# Patient Record
(demographics unavailable — no encounter records)

---

## 2024-10-19 NOTE — DISCUSSION/SUMMARY
[FreeTextEntry1] :  D/W caregiver viral URI- recommend supportive care including antipyretics, fluids, and nasal saline followed by nasal suction. Return if symptoms worsen or persist. No ankyloglossia on exam today- reviewed nipple size, feeding techniques, call for f/u if continued concerns. time spent : 20min

## 2024-10-19 NOTE — REVIEW OF SYSTEMS
[Nasal Congestion] : nasal congestion [Fever] : no fever [Cough] : no cough [Vomiting] : no vomiting [Diarrhea] : no diarrhea

## 2024-10-19 NOTE — HISTORY OF PRESENT ILLNESS
[de-identified] : per mom pt has congestion, afebrile  [FreeTextEntry6] :  + congestion X 2-3days no cough, no ST, no ear pain, no n/v/c; loose stool yesterday- no blood or mucus in stool, eating and drinking well, normal voiding. afebrile. no COVID or flu exposure mom also c/o on tongue tie, eats slowly- just increased nipple size, per mom pt able to protrude tongue beyond lower lip

## 2024-11-14 NOTE — PLAN
[TextEntry] :  Symptomatic treatment Instructed to return to office if condition worsens or new symptoms arise         Discussed with family eczema possible etiologies, natural course and possible complications.  Discussed preference of scent free and dye free skin contact products.  Discussed appropriate use of emollients and preferred brands.   increase dose famotidine as ordered

## 2024-11-14 NOTE — PHYSICAL EXAM
[TextEntry] : General: awake, alert, cooperative, appropriate, no acute distress Head: no signs injury Eyes: EOMI, PERRL, no discharge, no conjunctival or scleral erythema  Nose: no rhinnorhea Mouth: mucosa moist and pink Neck: supple, good range of motion Lungs: clear to auscultation bilaterally  Cardiac: normal S1 S2, regular rate and rhythm Abdomen: soft, non tender, non distended Lymphatics: no cervical lymphadenopathy, no pre or post auricular lymphadenopathy, no occipital lymphadenopathy Skin: erythematous mild  papular rash abdomen, cradle cap

## 2024-11-14 NOTE — HISTORY OF PRESENT ILLNESS
[de-identified] : Rash to stomach, no fevers [FreeTextEntry6] : rash on belly red and bumpy started using new sleep sack but otherwise no new contacts no sck symptoms feelingwell otherwise eating/drinking well some reflux worsening again mom wants to know if shes outgrowing dose

## 2024-12-03 NOTE — HISTORY OF PRESENT ILLNESS
[de-identified] : glassy eyes, exposed to covid  [FreeTextEntry6] : Exposed to covid 5 days ago Eyes looked glassy yesterday and today, no discharge or redness Slight cough today No fever or temp > 100 No ear pulling No wheezing or dyspnea No nasal congestion Normal appetite, No vomiting, No diarrhea No recent travel or contact with travelers

## 2024-12-03 NOTE — PHYSICAL EXAM
[Playful] : playful [Clear Rhinorrhea] : clear rhinorrhea [Soft] : soft [Warm, Well Perfused x4] : warm, well perfused x4 [NL] : warm, clear [Lethargic] : not lethargic [Toxic] : not toxic [Stridor] : no stridor [Increased Tearing] : no increased tearing [Discharge] : no discharge [Eyelid Swelling] : no eyelid swelling [Conjuctival Injection] : no conjunctival injection [Erythema] : no erythema [Bulging] : not bulging [Vesicles] : no vesicles [Exudate] : no exudate [Ulcerative Lesions] : no ulcerative lesions [Wheezing] : no wheezing [Rales] : no rales [Crackles] : no crackles [Transmitted Upper Airway Sounds] : no transmitted upper airway sounds [Tachypnea] : no tachypnea [Rhonchi] : no rhonchi [Belly Breathing] : no belly breathing [Subcostal Retractions] : no subcostal retractions [Suprasternal Retractions] : no suprasternal retractions [Distended] : nondistended [FreeTextEntry2] : AFOF

## 2024-12-03 NOTE — DISCUSSION/SUMMARY
[FreeTextEntry1] : Symptomatic treatment advised Covid test Maintain adequate hydration Cool mist humidifier Saline nose drops, bulb suctioning as needed  Stressed handwashing and infection control Pay close observation for new or worsening symptoms Instructed to return to office if condition worsens or new symptoms arise Go to ER or UC if condition worsens or unable to to get to the office or after office hours Recheck prn, has WCC tomorrow, advised dad to keep appointment so baby can get rechecked

## 2024-12-03 NOTE — REVIEW OF SYSTEMS
"Pre-Visit Planning   Next 5 appointments (look out 90 days)    Apr 28, 2021  Arrive by 8:50 AM  Office Visit with Shiraz Gil MD  [facility] ([facility] ) [address] [facility] [id number] [phone]        Appointment Notes for this encounter:   reviewed JQ // med check    Questionnaires Reviewed/Assigned  No additional questionnaires are needed        Patient preferred phone number: [phone]      Spoke to patient via phone. Patient does not have additional questions or concerns.        Visit is not preventive.    Patient is established.    Patient reminded of date and time.   Chief complaint confirmed.    Health Maintenance Due   Topic Date Due     ANNUAL REVIEW OF HM ORDERS  Never done     ADVANCE CARE PLANNING  Never done     HIV SCREENING  Never done     HEPATITIS C SCREENING  Never done     INFLUENZA VACCINE (1) 09/01/2020     DTAP/TDAP/TD IMMUNIZATION (2 - Td) 09/01/2020     PREVENTIVE CARE VISIT  12/20/2020     PHQ-2  01/01/2021     [employer]  Patient is active on [employer].    Questionnaire Review   Offered information on completing questionnaires via [employer].    Call Summary  \"Thank you for your time today.  If anything comes up before your appointment, please feel free to contact us at [phone].\"    " [Cough] : cough [Negative] : Genitourinary [Fever] : no fever [Eye Discharge] : no eye discharge [Ear Tugging] : no ear tugging [Nasal Discharge] : no nasal discharge [Nasal Congestion] : no nasal congestion [Cyanosis] : no cyanosis [Tachypnea] : not tachypneic [Wheezing] : no wheezing

## 2024-12-04 NOTE — HISTORY OF PRESENT ILLNESS
[Parents] : parents [Well-balanced] : well-balanced [Normal] : Normal [___ voids per day] : [unfilled] voids per day [Frequency of stools: ___] : Frequency of stools: [unfilled]  stools [In Bassinet/Crib] : sleeps in bassinet/crib [On back] : sleeps on back [Sleeps 12-16 hours per 24 hours (including naps)] : sleeps 12-16 hours per 24 hours (including naps) [Tummy time] : tummy time [No] : No cigarette smoke exposure [Water heater temperature set at <120 degrees F] : Water heater temperature set at <120 degrees F [Rear facing car seat in back seat] : Rear facing car seat in back seat [Carbon Monoxide Detectors] : Carbon monoxide detectors at home [Smoke Detectors] : Smoke detectors at home. [Co-sleeping] : no co-sleeping [Loose bedding, pillow, toys, and/or bumpers in crib] : no loose bedding, pillow, toys, and/or bumpers in crib [Exposure to electronic nicotine delivery system] : No exposure to electronic nicotine delivery system [FreeTextEntry7] : 4 month well [de-identified] : Enfamil Gentlease 5oz every 2-3 hours, sleeps overnight (25-30oz/day) [FreeTextEntry1] : No specialists  Pending dermatology for dry skin/eczema

## 2024-12-04 NOTE — DEVELOPMENTAL MILESTONES
[Normal Development] : Normal Development [None] : none [Laughs aloud] : laughs aloud [Turns to voice] : turns to voice [Vocalizes with extending cooing] : vocalizes with extending cooing [FreeTextEntry1] : GM -4-1 FM- 4-2 PS -4 L -4-1

## 2024-12-04 NOTE — PLAN
[TextEntry] : Recommend daily moisturizer for dry skin. Avoid synthetic clothing. Bathe every 2-3 days, avoiding hot water.  Sleep with cool mist humidifier. See dermatology   Recommend exclusive breastfeeding, 8-12 feedings per day. Mother should continue prenatal vitamins and avoid alcohol. If formula is needed, recommend iron-fortified formulations 6-8 feeds per day. When in car, patient should be in rear-facing car seat in back seat, do not leave baby in car for any reason or any time. Put baby to sleep on back, in own crib with no loose or soft bedding, no co-sleeping. Do not overheat baby. Protect baby by avoiding large crowds, extreme hot or cold environments, do not put baby in direct sun. Help baby to develop sleep and feeding routines. May offer pacifier if needed. Continue tummy time when awake and observed, to encourage play and muscle strength. Avoid screen/tv time at this age. Limit baby's exposure to others, especially those with fever or unknown vaccine status. Wash hands often, especially after changing diaper. Keep tobacco/vape free household and environment. Encourage family/caregivers to get protective vaccinations (flu, tdap). Parents counseled to call if rectal temperature >100.4 degrees F. Encourage parents to take CPR program/first aid program for infant. Start creating a daily routine for feeds and naps. Encourage both quiet and active playtime with baby for emerging social play development.   Iron fortified cereals mixed with formula or breast milk may be introduced using a spoon and bowl. Reinforced to only start solids/cereals if baby is able to hold head upright without support.  Monitor for tooth eruption, no bottles in bed, can start wiping erupting teeth with wash cloth or a soft toothbrush.   [ Vaccinations given at this visit can cause low grade temperature, irritability, and site reaction -- can use warm compress and Tylenol as needed ]   Return for 6 month well visit or sooner if needed or if concerns arise.

## 2024-12-04 NOTE — PHYSICAL EXAM
[Alert] : alert [Acute Distress] : no acute distress [Normocephalic] : normocephalic [Flat Open Anterior [place]] : flat open anterior fontanelle [Red Reflex] : red reflex bilateral [PERRL] : PERRL [Normally Placed Ears] : normally placed ears [Auricles Well Formed] : auricles well formed [Clear Tympanic membranes] : clear tympanic membranes [Light reflex present] : light reflex present [Bony landmarks visible] : bony landmarks visible [Discharge] : no discharge [Nares Patent] : nares patent [Palate Intact] : palate intact [Uvula Midline] : uvula midline [Palpable Masses] : no palpable masses [Symmetric Chest Rise] : symmetric chest rise [Clear to Auscultation Bilaterally] : clear to auscultation bilaterally [Regular Rate and Rhythm] : regular rate and rhythm [S1, S2 present] : S1, S2 present [Murmurs] : no murmurs [+2 Femoral Pulses] : (+) 2 femoral pulses [Soft] : soft [Tender] : nontender [Distended] : nondistended [Bowel Sounds] : bowel sounds present [Hepatomegaly] : no hepatomegaly [Splenomegaly] : no splenomegaly [Normal External Genitalia] : normal external genitalia [Clitoromegaly] : no clitoromegaly [Normal Vaginal Introitus] : normal vaginal introitus [Patent] : patent [Normally Placed] : normally placed [No Abnormal Lymph Nodes Palpated] : no abnormal lymph nodes palpated [Sheriff-Ortolani] : negative Sheriff-Ortolani [Allis Sign] : negative Allis sign [Spinal Dimple] : no spinal dimple [Tuft of Hair] : no tuft of hair [Startle Reflex] : startle reflex present [Plantar Grasp] : plantar grasp reflex present [Symmetric Soto] : symmetric soto [Rash or Lesions] : no rash/lesions

## 2025-01-31 NOTE — PHYSICAL EXAM
[Alert] : alert [Normocephalic] : normocephalic [Flat Open Anterior [place]] : flat open anterior fontanelle [Red Reflex] : red reflex bilateral [PERRL] : PERRL [Normally Placed Ears] : normally placed ears [Auricles Well Formed] : auricles well formed [Clear Tympanic membranes] : clear tympanic membranes [Light reflex present] : light reflex present [Bony landmarks visible] : bony landmarks visible [Nares Patent] : nares patent [Palate Intact] : palate intact [Uvula Midline] : uvula midline [Supple, full passive range of motion] : supple, full passive range of motion [Symmetric Chest Rise] : symmetric chest rise [Clear to Auscultation Bilaterally] : clear to auscultation bilaterally [Regular Rate and Rhythm] : regular rate and rhythm [S1, S2 present] : S1, S2 present [+2 Femoral Pulses] : (+) 2 femoral pulses [Soft] : soft [Bowel Sounds] : bowel sounds present [Normal External Genitalia] : normal external genitalia [Normal Vaginal Introitus] : normal vaginal introitus [Patent] : patent [Normally Placed] : normally placed [No Abnormal Lymph Nodes Palpated] : no abnormal lymph nodes palpated [Symmetric Buttocks Creases] : symmetric buttocks creases [Plantar Grasp] : plantar grasp reflex present [Cranial Nerves Grossly Intact] : cranial nerves grossly intact [Acute Distress] : no acute distress [Discharge] : no discharge [Tooth Eruption] : no tooth eruption [Palpable Masses] : no palpable masses [Murmurs] : no murmurs [Tender] : nontender [Distended] : nondistended [Hepatomegaly] : no hepatomegaly [Splenomegaly] : no splenomegaly [Clitoromegaly] : no clitoromegaly [Sheriff-Ortolani] : negative Sheriff-Ortolani [Allis Sign] : negative Allis sign [Spinal Dimple] : no spinal dimple [Tuft of Hair] : no tuft of hair [Rash or Lesions] : no rash/lesions [de-identified] : unequal duplicated gluteal cleft

## 2025-01-31 NOTE — PLAN
[TextEntry] : Continue recommend iron-fortified formulations as discussed. When in car, patient should be in rear-facing car seat in back seat, do not leave baby in car for any reason or any time. Put baby to sleep on back, in own crib with no loose or soft bedding, no co-sleeping. Can use sunscreen if outside, do not put baby in direct sunlight. Help baby to develop consistent sleep and feeding routines. May offer pacifier if needed. Continue tummy time when awake and observed, to encourage play and muscle strength. Encourage sitting up. Avoid screen/tv time at this age. Wash hands often, especially after changing diaper. Keep tobacco/vape free household and environment. Encourage family/caregivers to get protective vaccinations. Talk with your baby, play music and sing, can play peekaboo with baby. Encourage face to face interactions and communication. Encourage self-calming behaviors. Maintain safe home due to increasingly mobile baby. Do not leave baby alone.   Monitor for tooth eruption, no propping bottles, and no bottles in bed, can start wiping erupting teeth with wash cloth or a soft toothbrush. Introduce single-ingredient foods rich in iron varying in texture, one at a time, several apart to watch for any allergic reaction. Do not give baby honey. Avoid sweetened drinks/juices. Avoid choking by limiting finger foods to smaller than a cheerio. No hard/compressible foods. Start daily multivitamin with fluoride.   [ Vaccinations given at this visit can cause low grade temperature, irritability, and site reaction -- can use warm compress and Tylenol / Ibuprofen as needed ]   See neuro for gluteal cleft concerns, and see allergist for oat allergy, avoid oats  PT referral placed for gross motor delay concerns   Return for 9 month well visit or sooner if needed or if concerns arise.

## 2025-01-31 NOTE — DEVELOPMENTAL MILESTONES
[Normal Development] : Normal Development [None] : none [Pats or smiles at reflection] : pats or smiles at reflection [Begins to turn when name called] : begins to turn when name called [Babbles] : babbles [Reaches for object and transfers] : reaches for object and transfers [Rakes small object with 4 fingers] : rakes small object with 4 fingers [[place] small object on surface] : bangs small object on surface [Rolls over prone to supine] : does not roll over prone to supine [Sits briefly without support] : does not sit briefly without support [FreeTextEntry1] : denver reviewed  gross motor concerns

## 2025-01-31 NOTE — DEVELOPMENTAL MILESTONES
[Normal Development] : Normal Development [None] : none [Pats or smiles at reflection] : pats or smiles at reflection [Begins to turn when name called] : begins to turn when name called [Babbles] : babbles [Reaches for object and transfers] : reaches for object and transfers [Rakes small object with 4 fingers] : rakes small object with 4 fingers [Thayer small object on surface] : bangs small object on surface [Rolls over prone to supine] : does not roll over prone to supine [Sits briefly without support] : does not sit briefly without support [FreeTextEntry1] : denver reviewed  gross motor concerns

## 2025-01-31 NOTE — DISCUSSION/SUMMARY
[Normal Growth] : growth [Normal Development] : development [None] : No medical problems [No Elimination Concerns] : elimination [No Feeding Concerns] : feeding [No Skin Concerns] : skin [Normal Sleep Pattern] : sleep [Family Functioning] : family functioning [Nutrition and Feeding] : nutrition and feeding [Infant Development] : infant development [Oral Health] : oral health [Safety] : safety [No Medications] : ~He/She~ is not on any medications [Parent/Guardian] : parent/guardian [] : The components of the vaccine(s) to be administered today are listed in the plan of care. The disease(s) for which the vaccine(s) are intended to prevent and the risks have been discussed with the caretaker.  The risks are also included in the appropriate vaccination information statements which have been provided to the patient's caregiver.  The caregiver has given consent to vaccinate. [FreeTextEntry1] : deferred flu/covid

## 2025-01-31 NOTE — PHYSICAL EXAM
[Alert] : alert [Normocephalic] : normocephalic [Flat Open Anterior [place]] : flat open anterior fontanelle [Red Reflex] : red reflex bilateral [PERRL] : PERRL [Normally Placed Ears] : normally placed ears [Auricles Well Formed] : auricles well formed [Clear Tympanic membranes] : clear tympanic membranes [Light reflex present] : light reflex present [Bony landmarks visible] : bony landmarks visible [Nares Patent] : nares patent [Palate Intact] : palate intact [Uvula Midline] : uvula midline [Supple, full passive range of motion] : supple, full passive range of motion [Symmetric Chest Rise] : symmetric chest rise [Clear to Auscultation Bilaterally] : clear to auscultation bilaterally [Regular Rate and Rhythm] : regular rate and rhythm [S1, S2 present] : S1, S2 present [+2 Femoral Pulses] : (+) 2 femoral pulses [Soft] : soft [Bowel Sounds] : bowel sounds present [Normal External Genitalia] : normal external genitalia [Normal Vaginal Introitus] : normal vaginal introitus [Patent] : patent [Normally Placed] : normally placed [No Abnormal Lymph Nodes Palpated] : no abnormal lymph nodes palpated [Symmetric Buttocks Creases] : symmetric buttocks creases [Plantar Grasp] : plantar grasp reflex present [Cranial Nerves Grossly Intact] : cranial nerves grossly intact [Acute Distress] : no acute distress [Discharge] : no discharge [Tooth Eruption] : no tooth eruption [Palpable Masses] : no palpable masses [Murmurs] : no murmurs [Tender] : nontender [Distended] : nondistended [Hepatomegaly] : no hepatomegaly [Splenomegaly] : no splenomegaly [Clitoromegaly] : no clitoromegaly [Sheriff-Ortolani] : negative Sheriff-Ortolani [Allis Sign] : negative Allis sign [Spinal Dimple] : no spinal dimple [Tuft of Hair] : no tuft of hair [Rash or Lesions] : no rash/lesions [de-identified] : unequal duplicated gluteal cleft

## 2025-01-31 NOTE — HISTORY OF PRESENT ILLNESS
[Normal] : Normal [Well-balanced] : well-balanced [Frequency of stools: ___] : Frequency of stools: [unfilled]  stools [In Bassinet/Crib] : sleeps in bassinet/crib [On back] : sleeps on back [Sleeps 12-16 hours per 24 hours (including naps)] : sleeps 12-16 hours per 24 hours (including naps) [Pacifier use] : Pacifier use [Tummy time] : tummy time [No] : No cigarette smoke exposure [Water heater temperature set at <120 degrees F] : Water heater temperature set at <120 degrees F [Rear facing car seat in back seat] : Rear facing car seat in back seat [Carbon Monoxide Detectors] : Carbon monoxide detectors at home [Smoke Detectors] : Smoke detectors at home. [Co-sleeping] : no co-sleeping [Loose bedding, pillow, toys, and/or bumpers in crib] : no loose bedding, pillow, toys, and/or bumpers in crib [Exposure to electronic nicotine delivery system] : No exposure to electronic nicotine delivery system [de-identified] : 6m well visit  [de-identified] : avocados, and carrots; gentlease 30 oz a day, sleeps through the night  [FreeTextEntry1] : No specialists   Has eczema, doing well   had oatmeal bath and broke out into full body rash and bumps, no distress/other allergic symptoms, no dyspnea/color changes/wheezing or vomiting, rash disappeared after a day, avoiding oats now, wants to get checked for allergy

## 2025-01-31 NOTE — HISTORY OF PRESENT ILLNESS
[Normal] : Normal [Well-balanced] : well-balanced [Frequency of stools: ___] : Frequency of stools: [unfilled]  stools [In Bassinet/Crib] : sleeps in bassinet/crib [On back] : sleeps on back [Sleeps 12-16 hours per 24 hours (including naps)] : sleeps 12-16 hours per 24 hours (including naps) [Pacifier use] : Pacifier use [Tummy time] : tummy time [No] : No cigarette smoke exposure [Water heater temperature set at <120 degrees F] : Water heater temperature set at <120 degrees F [Rear facing car seat in back seat] : Rear facing car seat in back seat [Carbon Monoxide Detectors] : Carbon monoxide detectors at home [Smoke Detectors] : Smoke detectors at home. [Co-sleeping] : no co-sleeping [Loose bedding, pillow, toys, and/or bumpers in crib] : no loose bedding, pillow, toys, and/or bumpers in crib [Exposure to electronic nicotine delivery system] : No exposure to electronic nicotine delivery system [de-identified] : 6m well visit  [de-identified] : avocados, and carrots; gentlease 30 oz a day, sleeps through the night  [FreeTextEntry1] : No specialists   Has eczema, doing well   had oatmeal bath and broke out into full body rash and bumps, no distress/other allergic symptoms, no dyspnea/color changes/wheezing or vomiting, rash disappeared after a day, avoiding oats now, wants to get checked for allergy

## 2025-02-20 NOTE — ASSESSMENT
[FreeTextEntry1] : Generalized maculopapular rash in contact with oat in an oatmeal bath: Skin testing negative for oat.  Avoid skin contact with oat containing products.  May be able to eat out.  Work for IgE to oat and other foods. Atopic dermatitis: Skin testing positive for milk and egg white, negative for egg yolk, peanut, soy and dust mites, positive for cat.  Continue current formula for now, may need hypoallergenic formula if severe eczema persists.  Blood work for IgE to these foods, including milk and egg component.  Auvi-Q 0.1 mg discussed but not prescribed yet.  Will wait for the blood work results, in accord with the parents.  Food allergy plan given and reviewed.  Information on skin care with daily baths for 20 minutes followed by aggressive skin moisturizer.  Environmental control for cat dander. Hydrocortisone 2.5% cream, apply twice daily to active eczema, for 3 to 5 days at a time. Decide follow-up after results of blood work received.

## 2025-02-20 NOTE — REASON FOR VISIT
[Evaluation/Consultation] : an evaluation/consultation of [To Food] : allergy to food [Eczema] : eczema [Parents] : parents

## 2025-02-20 NOTE — SOCIAL HISTORY
[de-identified] : House with oil radiator heating, no carpet in the bedroom, room air conditioning, cats, no cigarette smoke exposure.

## 2025-02-20 NOTE — HISTORY OF PRESENT ILLNESS
[de-identified] : In office to be evaluated for eczema and allergic reaction. Born full-term to vaginal delivery and exclusively breast-fed for 2 weeks, subsequently up to 2 months she received breastmilk and cows milk formula (gentle ease.  She had severe abdominal pain and and reflux initially, now much improved.  She developed eczema from around 3 months of age, in the fall, involving the head, neck, abdomen and cheeks.  Applying topical cream with oatmeal seem to increase the eczema.  She had an oatmeal bath and she developed generalized pruritic maculopapular rash that lasted for about 3 days.  She did not ingest oat or other grains.  She was also not introduced to egg or peanut. Current skin care: Baths every other day for 10 to 15 minutes, Dove unscented soap, Cetaphil moisturizer did not help, family switched to Honest unscented cream applied with every diaper change, occasional hydrocortisone ointment over-the-counter. Chronic stuffy nose but no frequent antibiotics and no need for nebulizer.

## 2025-02-20 NOTE — PHYSICAL EXAM
[Alert] : alert [Well Nourished] : well nourished [Healthy Appearance] : healthy appearance [No Acute Distress] : no acute distress [Well Developed] : well developed [Supple] : the neck was supple [Normal S1, S2] : normal S1 and S2 [Regular Rhythm] : with a regular rhythm [Soft] : abdomen soft [No HSM] : no hepato-splenomegaly [Normal Cervical Lymph Nodes] : cervical [No clubbing] : no clubbing [No Cyanosis] : no cyanosis [Alert, Awake, Oriented as Age-Appropriate] : alert, awake, oriented as age appropriate [de-identified] : Eyes clear. [de-identified] : Nasal mucosa pink, no discharge or stuffiness.  Tympanic membranes normal. [de-identified] : Chest clear, good air entry, no wheezing or crackles. [de-identified] : Widespread dryness and patchy erythema on arms, legs, neck, abdomen and chest, cheeks.  No rash on the back.

## 2025-04-22 NOTE — SOCIAL HISTORY
[de-identified] : House with oil radiator heating, no carpet in the bedroom, room air conditioning, air purifiers, 2 cats, no cigarette smoke exposure.

## 2025-04-22 NOTE — HISTORY OF PRESENT ILLNESS
[de-identified] : In office for follow-up for eczema and food allergies. History of eczema while being breast-fed and having Gentlease cows milk formula.  She also had acid reflux.  She had pruritic maculopapular rash after oatmeal bath.  Skin testing on 2/20/2025 was positive for egg white, and milk.  She was referred for blood work that showed high IgE to egg white, milk, wheat, also IgE to oat and borderline IgE to peanut (skin test negative).  She was advised to mix PurAmino with gentle ease half-and-half, avoid egg, wheat and oat, and introduce peanut.  She tolerated peanut twice.  After the third exposure she had massive bowel movement.  After the fourth exposure, she had 3 large bowel movements in a row, developed rash on her face, stuck her tongue out, eyes were itchy and swollen.  Symptoms cleared slowly spontaneously but the eyes were swollen by the next morning.  The diaper rash were so severe that she needed medication.  Subsequently not exposed to peanuts.  She was referred for blood work to tree nuts and fish, IgE could not be done.  She had urticaria from a moisturizer that contains sunflower seed oil and almonds.  She tolerates coconut.  Family has Auvi-Q.  Since drinking mixed cows milk formula with hypoallergenic formula, frequent spit ups cleared. Blood work also showed IgE to cat.  There are 2 cats in the house.  The rug in the living room was removed, air purifier is used, house is vacuumed daily.  Since reducing the formula by mixing with hypoallergenic formula and implementing environmental control, eczema has almost completely resolved.  She just has an itchy patch on the posterior neck.

## 2025-04-22 NOTE — ASSESSMENT
[FreeTextEntry1] : Food allergies: Recent reaction to peanut and almond.  Skin testing performed was positive for almond but negative for other tree nuts (Brazil nut, cashew, hazelnut, pecan, pistachio, walnut) and sesame seed.  Skin testing negative for tuna, codfish, salmon and flounder.  Known allergies (skin test positive and high IgE) to egg, milk, wheat, oat. Continue to avoid egg completely, wheat and oat.  Continue limited dairy (drinks half gentle ease formula with half PurAmino).  At 1, mix formula with pea milk.  If tolerated, may use three quarters pea milk and one quarter regular milk.  Avoid other forms of dairy. Avoid peanut and almond.  May introduce other tree nuts.  May introduce sesame seed (skin testing negative). Atopic dermatitis: Much improved after environmental control for cat dander and reducing cows milk formula intake.  Continue moisturizer and skin care. Retest with blood work for foods in late fall/early winter. Food allergy plan given and reviewed.  Family has Jacqueline

## 2025-04-22 NOTE — PHYSICAL EXAM
[Alert] : alert [Well Nourished] : well nourished [Healthy Appearance] : healthy appearance [No Acute Distress] : no acute distress [Well Developed] : well developed [Supple] : the neck was supple [Normal S1, S2] : normal S1 and S2 [Regular Rhythm] : with a regular rhythm [Soft] : abdomen soft [Normal Cervical Lymph Nodes] : cervical [Skin Intact] : skin intact  [No clubbing] : no clubbing [No Cyanosis] : no cyanosis [Alert, Awake, Oriented as Age-Appropriate] : alert, awake, oriented as age appropriate [de-identified] : Eyes clear. [de-identified] : Next mucosa pink, no stuffiness or discharge.  Tympanic membranes normal. [de-identified] : Chest clear, good air entry, no wheezing or crackles. [de-identified] : Patch of erythema posterior neck extending into occipital area.  Mild bilateral cheek erythema.  No other rashes.  Skin soft.

## 2025-04-30 NOTE — PLAN
[TextEntry] : Continue formula as desired. Increase table foods and with family mealtimes, 3 meals with 2-3 snacks per day. Discussed weaning of bottle and pacifier.   When in car, patient should be in rear-facing car seat in back seat, do not leave baby in car for any reason or any time. Put baby to sleep on back, in own crib with no loose or soft bedding, no co-sleeping. Can use sunscreen if if outside, do not put baby in direct sunlight. Help baby to develop consistent sleep and feeding routines. May offer pacifier if needed. Avoid screen/tv time at this age. Wash hands often, especially after changing diaper. Keep tobacco/vape free household and environment. Encourage family/caregivers to get protective vaccinations. Use sunscreen when outside, avoid direct sunlight. Recognize stranger anxiety. Ensure home is safe due to baby being increasingly mobile. Do not leave baby alone. Use consistent, positive discipline. Read and sing aloud to baby.   No propping bottles, and no bottles in bed, wash/clean teeth with wash cloth or a soft toothbrush Do not give baby honey. Avoid sweetened drinks/juices. Avoid choking by limiting finger foods to smaller than a cheerio. No hard/compressible foods. Continue daily multivitamin with fluoride.   Return for 12 month well visit or sooner if needed or if concerns arise.

## 2025-04-30 NOTE — HISTORY OF PRESENT ILLNESS
[Well-balanced] : well-balanced [Fruit] : fruit [Vegetables] : vegetables [Normal] : Normal [Water heater temperature set at <120 degrees F] : Water heater temperature set at <120 degrees F [Rear facing car seat in  back seat] : Rear facing car seat in  back seat [Carbon Monoxide Detectors] : Carbon monoxide detectors [Smoke Detectors] : Smoke detectors [Water] : no water [In Crib] : sleeps in crib [On back] : sleeps on back [Co-sleeping] : no co-sleeping [Wakes up at night] : does not wake up at night [Sleeps 12-16 hours per 24 hours (including naps)] : sleeps 12-16 hours per 24 hours (including naps) [Loose bedding, pillow, toys, and/or bumpers in crib] : no loose bedding, pillow, toys, and/or bumpers in crib [Sippy Cup use] : sippy cup use [Brushing teeth] : brushing teeth [Toothpaste] : Primary Fluoride Source: Toothpaste [No] : Not at  exposure [Up to date] : Up to date [FreeTextEntry7] : 9m well visit  [de-identified] : veggies and fruits, chicken and meat purees, pure amino hypo and enfamil gentlease mixing half and half  [FreeTextEntry1] : Follows with allergist, anaphylaxis to peanuts, has epi pen active prescription, no milk either, no reintroduction of any allergens at this time, on genltease mixed with allergenic formula advised by allergist to help with milk tolerance   no longer following with allergist

## 2025-04-30 NOTE — DEVELOPMENTAL MILESTONES
[Normal Development] : Normal Development [None] : none [Uses basic gestures] : uses basic gestures [Says "Joon" or "Mama"] : says "Joon" or "Mama" nonspecifically [Sits well without support] : sits well without support [Transitions between sitting and lying] : transitions between sitting and lying [Balances on hands and knees] : balances on hands and knees [Crawls] : crawls [Picks up small objects with 3 fingers] : picks up small objects with 3 fingers and thumb [Releases objects intentionally] : releases objects intentionally [[place] objects together] : bangs objects together [Yes] : Completed.

## 2025-04-30 NOTE — DISCUSSION/SUMMARY
[Normal Growth] : growth [Normal Development] : development [None] : No known medical problems [No Elimination Concerns] : elimination [No Feeding Concerns] : feeding [No Skin Concerns] : skin [Normal Sleep Pattern] : sleep [Family Adaptation] : family adaptation [Infant [place]] : infant independence [Feeding Routine] : feeding routine [Safety] : safety [No Medications] : ~He/She~ is not on any medications [Parent/Guardian] : parent/guardian

## 2025-04-30 NOTE — PHYSICAL EXAM
[Alert] : alert [Normocephalic] : normocephalic [Flat Open Anterior [place]] : flat open anterior fontanelle [Red Reflex] : red reflex bilateral [PERRL] : PERRL [Normally Placed Ears] : normally placed ears [Auricles Well Formed] : auricles well formed [Clear Tympanic membranes] : clear tympanic membranes [Light reflex present] : light reflex present [Bony landmarks visible] : bony landmarks visible [Nares Patent] : nares patent [Palate Intact] : palate intact [Uvula Midline] : uvula midline [Supple, full passive range of motion] : supple, full passive range of motion [Symmetric Chest Rise] : symmetric chest rise [Clear to Auscultation Bilaterally] : clear to auscultation bilaterally [Regular Rate and Rhythm] : regular rate and rhythm [S1, S2 present] : S1, S2 present [+2 Femoral Pulses] : (+) 2 femoral pulses [Soft] : soft [Bowel Sounds] : bowel sounds present [Normal External Genitalia] : normal external genitalia [Normal Vaginal Introitus] : normal vaginal introitus [No Abnormal Lymph Nodes Palpated] : no abnormal lymph nodes palpated [Symmetric abduction and rotation of hips] : symmetric abduction and rotation of hips [Straight] : straight [Cranial Nerves Grossly Intact] : cranial nerves grossly intact [Acute Distress] : no acute distress [Excessive Tearing] : no excessive tearing [Discharge] : no discharge [Palpable Masses] : no palpable masses [Murmurs] : no murmurs [Tender] : nontender [Distended] : nondistended [Hepatomegaly] : no hepatomegaly [Splenomegaly] : no splenomegaly [Clitoromegaly] : no clitoromegaly [Allis Sign] : negative Allis sign [Rash or Lesions] : no rash/lesions

## 2025-07-30 NOTE — HISTORY OF PRESENT ILLNESS
[Normal] : Normal [On back] : On back [In crib] : In crib [Brushing teeth] : Brushing teeth [Yes] : Patient goes to dentist yearly [Vitamin] : Primary Fluoride Source: Vitamin [Playtime] : Playtime  [No] : Not at  exposure [Water heater temperature set at <120 degrees F] : Water heater temperature set at <120 degrees F [Car seat in back seat] : Car seat in back seat [Smoke Detectors] : Smoke detectors [Carbon Monoxide Detectors] : Carbon monoxide detectors [Exposure to electronic nicotine delivery system] : No exposure to electronic nicotine delivery system [At risk for exposure to TB] : Not at risk for exposure to Tuberculosis [FreeTextEntry7] : 12 month well visit  [de-identified] : mixing pure amino and gentlease mixing and tolerating 24 oz per day  [FreeTextEntry1] : allergist in fall sometime  repeating testing in fall for blood

## 2025-07-30 NOTE — HISTORY OF PRESENT ILLNESS
[Normal] : Normal [On back] : On back [In crib] : In crib [Brushing teeth] : Brushing teeth [Yes] : Patient goes to dentist yearly [Vitamin] : Primary Fluoride Source: Vitamin [Playtime] : Playtime  [No] : Not at  exposure [Water heater temperature set at <120 degrees F] : Water heater temperature set at <120 degrees F [Car seat in back seat] : Car seat in back seat [Smoke Detectors] : Smoke detectors [Carbon Monoxide Detectors] : Carbon monoxide detectors [Exposure to electronic nicotine delivery system] : No exposure to electronic nicotine delivery system [At risk for exposure to TB] : Not at risk for exposure to Tuberculosis [FreeTextEntry7] : 12 month well visit  [de-identified] : mixing pure amino and gentlease mixing and tolerating 24 oz per day  [FreeTextEntry1] : allergist in fall sometime  repeating testing in fall for blood